# Patient Record
Sex: MALE | Race: WHITE | NOT HISPANIC OR LATINO | Employment: UNEMPLOYED | URBAN - METROPOLITAN AREA
[De-identification: names, ages, dates, MRNs, and addresses within clinical notes are randomized per-mention and may not be internally consistent; named-entity substitution may affect disease eponyms.]

---

## 2017-07-05 ENCOUNTER — ALLSCRIPTS OFFICE VISIT (OUTPATIENT)
Dept: OTHER | Facility: OTHER | Age: 57
End: 2017-07-05

## 2017-11-22 ENCOUNTER — ALLSCRIPTS OFFICE VISIT (OUTPATIENT)
Dept: OTHER | Facility: OTHER | Age: 57
End: 2017-11-22

## 2017-11-23 NOTE — PROGRESS NOTES
Assessment    1  Moderate major depression (296 22) (F32 1)    Plan  Anxiety and depression    · ClonazePAM 0 5 MG Oral Tablet; Take 1 tablet twice daily as needed  Anxiety and depression, Moderate major depression    · 1 - Seven CAAB, Danyelle Lucero  (Psychiatry) Co-Management  *  Status: Hold For - Scheduling Requested for: [id number]  Care Summary provided  : Yes    Discussion/Summary    1  Anxiety-not at goal  We discussed treatment options and patient had been seeing Psychiatry and till he had passed away  He was best maintained on Lexapro 40 mg daily  We discussed possible risk associated with this as it is above the generalized dosing guidelines, but will resume at that dose until he can get in to see Psychiatry in the future  He will be referred to Dr Terrell Cervantes  He was also offered counseling with Madyson Barrow if he desires  He will also be given clonazepam 0 5 mg to be used up to twice daily as needed for anxiety  Appropriate use of medication as well as side effects were discussed with the patient  Patient has had flights of suicidal ideation but no plan and no current thoughts  He is able to contract for safety  Recommend close follow-up in the next 2-4 weeks to reassess therapy until he gets in with Psychiatry  The patient was counseled regarding diagnostic results,-- instructions for management,-- risk factor reductions,-- prognosis,-- patient and family education,-- impressions,-- risks and benefits of treatment options,-- importance of compliance with treatment  total time of encounter was 25 minutes-- and-- 20 minutes was spent counseling  Chief Complaint  Follow up to discuss Anxiety  It has been getting worse over the past 3 weeks  History of Present Illness  This is a 80-year-old gentleman that presents to the office with concerns over increasing anxiety symptoms  He has had difficulty with anxiety and depression for many years   He had been seeing Psychiatry several years ago but eventually his psychiatrist passed away  He had been feeling best when he was on Lexapro 40 mg daily but since he has returned our office has been reduced to 20 mg daily which did not seem to help as much  He then was added on Wellbutrin which he did not see much benefit with  He does currently have several life stressors  He has had flights of suicidal ideation in the past but no plan or current ideation  Review of Systems   Constitutional: feeling poorly-- and-- feeling tired, but-- no fever-- and-- no chills  ENT: no nosebleeds-- and-- no nasal discharge  Cardiovascular: no chest pain-- and-- no palpitations  Respiratory: no shortness of breath,-- no cough-- and-- no wheezing  Gastrointestinal: no nausea-- and-- no diarrhea  Musculoskeletal: no arthralgias-- and-- no myalgias  Neurological: no headache  Hematologic/Lymphatic: no swollen glands  Active Problems  1  Back pain (724 5) (M54 9)   2  Encounter for prostate cancer screening (V76 44) (Z12 5)   3  Fatigue (780 79) (R53 83)   4  GERD without esophagitis (530 81) (K21 9)   5  Hyperlipidemia (272 4) (E78 5)   6  Moderate major depression (296 22) (F32 1)   7  Neck pain (723 1) (M54 2)   8  Right leg pain (729 5) (M79 604)    Past Medical History    The active problems and past medical history were reviewed and updated today  Social History     · Being A Social Drinker   · Chews tobacco (305 1) (Z72 0)   · Employed   ·    · Never a smoker   · No secondhand smoke exposure (V49 89) (Z78 9)    Current Meds   1  BuPROPion HCl ER (XL) 150 MG Oral Tablet Extended Release 24 Hour; take 1 tab daily; Therapy: [id number] to (Last Rx:[id number])  Requested for: 09JUH9620; Status: ACTIVE - Transmit to Pharmacy - Awaiting Verification Ordered   2  Escitalopram Oxalate 20 MG Oral Tablet; TAKE 1 TABLET DAILY (OFFICE VISIT NEEDED);  Therapy: [id number] to (Last OO:[id number])  Requested for: [id number] Ordered    The medication list was reviewed and updated today  Allergies  1  No Known Drug Allergies    Vitals  Vital Signs    Recorded: 22Nov2017 11:53AM   Heart Rate 76   Systolic 167   Diastolic 90   Height 5 ft 10 in   Weight 188 lb    BMI Calculated 26 98   BSA Calculated 2 03       Physical Exam   Constitutional  General appearance: No acute distress, well appearing and well nourished  Pulmonary  Respiratory effort: No increased work of breathing or signs of respiratory distress  Musculoskeletal  Gait and station: Normal    Skin  Skin and subcutaneous tissue: Normal without rashes or lesions     Psychiatric  Orientation to person, place and time: Normal    Mood and affect: Normal          Signatures   Electronically signed by : Shiv Barroso [facility]; Nov 22 2017 12:22PM EST                       (Author)    Electronically signed by : MARVIN Canada ; Nov 22 2017  6:19PM EST

## 2018-01-12 VITALS
BODY MASS INDEX: 28.54 KG/M2 | DIASTOLIC BLOOD PRESSURE: 80 MMHG | SYSTOLIC BLOOD PRESSURE: 120 MMHG | WEIGHT: 199.38 LBS | HEIGHT: 70 IN | HEART RATE: 68 BPM

## 2018-01-14 VITALS
HEART RATE: 76 BPM | HEIGHT: 70 IN | DIASTOLIC BLOOD PRESSURE: 90 MMHG | WEIGHT: 188 LBS | SYSTOLIC BLOOD PRESSURE: 138 MMHG | BODY MASS INDEX: 26.92 KG/M2

## 2018-02-06 DIAGNOSIS — F32.1 MODERATE MAJOR DEPRESSION (HCC): Primary | ICD-10-CM

## 2018-02-06 RX ORDER — ESCITALOPRAM OXALATE 20 MG/1
TABLET ORAL
Qty: 90 TABLET | Refills: 0 | Status: SHIPPED | OUTPATIENT
Start: 2018-02-06 | End: 2019-04-18

## 2019-04-18 ENCOUNTER — OFFICE VISIT (OUTPATIENT)
Dept: FAMILY MEDICINE CLINIC | Facility: CLINIC | Age: 59
End: 2019-04-18
Payer: COMMERCIAL

## 2019-04-18 VITALS
HEIGHT: 70 IN | DIASTOLIC BLOOD PRESSURE: 70 MMHG | WEIGHT: 191 LBS | SYSTOLIC BLOOD PRESSURE: 124 MMHG | HEART RATE: 84 BPM | BODY MASS INDEX: 27.35 KG/M2 | TEMPERATURE: 98.7 F

## 2019-04-18 DIAGNOSIS — H65.92 LEFT OTITIS MEDIA WITH EFFUSION: Primary | ICD-10-CM

## 2019-04-18 PROCEDURE — 92567 TYMPANOMETRY: CPT | Performed by: PHYSICIAN ASSISTANT

## 2019-04-18 PROCEDURE — 99214 OFFICE O/P EST MOD 30 MIN: CPT | Performed by: PHYSICIAN ASSISTANT

## 2019-04-18 PROCEDURE — 1036F TOBACCO NON-USER: CPT | Performed by: PHYSICIAN ASSISTANT

## 2019-04-18 PROCEDURE — 3008F BODY MASS INDEX DOCD: CPT | Performed by: PHYSICIAN ASSISTANT

## 2019-04-18 RX ORDER — BUSPIRONE HYDROCHLORIDE 30 MG/1
TABLET ORAL
COMMUNITY
Start: 2019-03-25

## 2019-04-18 RX ORDER — DULOXETIN HYDROCHLORIDE 60 MG/1
CAPSULE, DELAYED RELEASE ORAL
COMMUNITY
Start: 2019-03-25

## 2019-04-18 RX ORDER — CEFUROXIME AXETIL 250 MG/1
250 TABLET ORAL EVERY 12 HOURS SCHEDULED
Qty: 20 TABLET | Refills: 0 | Status: SHIPPED | OUTPATIENT
Start: 2019-04-18 | End: 2019-04-28

## 2019-11-19 ENCOUNTER — OFFICE VISIT (OUTPATIENT)
Dept: FAMILY MEDICINE CLINIC | Facility: CLINIC | Age: 59
End: 2019-11-19
Payer: COMMERCIAL

## 2019-11-19 VITALS
TEMPERATURE: 98 F | HEIGHT: 70 IN | RESPIRATION RATE: 16 BRPM | HEART RATE: 76 BPM | SYSTOLIC BLOOD PRESSURE: 132 MMHG | BODY MASS INDEX: 28.15 KG/M2 | DIASTOLIC BLOOD PRESSURE: 78 MMHG | WEIGHT: 196.6 LBS

## 2019-11-19 DIAGNOSIS — M54.32 LEFT SIDED SCIATICA: Primary | ICD-10-CM

## 2019-11-19 DIAGNOSIS — F41.9 ANXIETY AND DEPRESSION: ICD-10-CM

## 2019-11-19 DIAGNOSIS — Z23 ENCOUNTER FOR IMMUNIZATION: ICD-10-CM

## 2019-11-19 DIAGNOSIS — F32.A ANXIETY AND DEPRESSION: ICD-10-CM

## 2019-11-19 PROCEDURE — 99214 OFFICE O/P EST MOD 30 MIN: CPT | Performed by: PHYSICIAN ASSISTANT

## 2019-11-19 PROCEDURE — 90682 RIV4 VACC RECOMBINANT DNA IM: CPT | Performed by: FAMILY MEDICINE

## 2019-11-19 PROCEDURE — 1036F TOBACCO NON-USER: CPT | Performed by: PHYSICIAN ASSISTANT

## 2019-11-19 PROCEDURE — 90471 IMMUNIZATION ADMIN: CPT | Performed by: FAMILY MEDICINE

## 2019-11-19 RX ORDER — PREDNISONE 20 MG/1
60 TABLET ORAL DAILY
Qty: 15 TABLET | Refills: 0 | Status: SHIPPED | OUTPATIENT
Start: 2019-11-19 | End: 2019-11-24

## 2019-11-19 RX ORDER — TIZANIDINE 4 MG/1
4 TABLET ORAL EVERY 8 HOURS PRN
Qty: 30 TABLET | Refills: 0 | Status: SHIPPED | OUTPATIENT
Start: 2019-11-19 | End: 2020-04-14 | Stop reason: SDUPTHER

## 2019-11-19 NOTE — PROGRESS NOTES
Assessment/Plan:  Patient Instructions     1  Left-sided sciatica- I did review x-ray report with patient showing disc space narrowing  It is likely has some bulging discs in the lumbar spine and is having exacerbation of symptoms  We will try a short course of prednisone 60 mg daily for 5 days  He was given tizanidine 4 mg as necessary for muscle spasm or discomfort  If his symptoms are not improving in the next week would consider further MRI since he has already been seeing chiropractics and doing regular stretching exercises  2   Anxiety with depression -presently stable on Cymbalta and Wellbutrin  3   Hyperlipidemia- stable with diet control  Diagnoses and all orders for this visit:    Left sided sciatica  -     predniSONE 20 mg tablet; Take 3 tablets (60 mg total) by mouth daily for 5 days  -     tiZANidine (ZANAFLEX) 4 mg tablet; Take 1 tablet (4 mg total) by mouth every 8 (eight) hours as needed for muscle spasms    Anxiety and depression          Subjective:      Patient ID: Vega Esquivel is a 61 y o  male  HPI:  This is a 49-year-old gentleman that presents to the office with concerns over pain in his low back which has been radiating down his left leg for the past month or so  Symptoms have been getting a little worse recently and have been persistent  He is not having specific night symptoms but does occasionally have difficulty with waking and having trouble finding a comfortable sleeping position  He has pain when bending to his left side  Pain seems to radiate down the anterior portion of his leg toward his ankle  He has had history of low back problems in the past but more on the right side  He has been seeing a chiropractor and had x-rays recently  He has not been having any stumbling, tripping, or foot drop        The following portions of the patient's history were reviewed and updated as appropriate: allergies, current medications, past family history, past medical history, past social history, past surgical history and problem list     Review of Systems   Constitutional: Negative for chills, fatigue and fever  HENT: Negative for congestion, ear pain and sinus pressure  Eyes: Negative for visual disturbance  Respiratory: Negative for cough, chest tightness and shortness of breath  Cardiovascular: Negative for chest pain and palpitations  Gastrointestinal: Negative for diarrhea, nausea and vomiting  Endocrine: Negative for polyuria  Genitourinary: Negative for dysuria and frequency  Musculoskeletal: Positive for arthralgias, back pain and myalgias  Skin: Negative for pallor and rash  Neurological: Negative for dizziness, weakness, light-headedness, numbness and headaches  Psychiatric/Behavioral: Negative for agitation, behavioral problems and sleep disturbance  All other systems reviewed and are negative  Objective:      /78 (BP Location: Left arm, Patient Position: Sitting, Cuff Size: Adult)   Pulse 76   Temp 98 °F (36 7 °C) (Oral)   Resp 16   Ht 5' 10" (1 778 m)   Wt 89 2 kg (196 lb 9 6 oz)   BMI 28 21 kg/m²          Physical Exam   Constitutional: He is oriented to person, place, and time  He appears well-developed and well-nourished  No distress  HENT:   Head: Normocephalic and atraumatic  Right Ear: External ear normal    Left Ear: External ear normal    Nose: Nose normal    Mouth/Throat: Oropharynx is clear and moist  No oropharyngeal exudate  Eyes: Pupils are equal, round, and reactive to light  Conjunctivae and EOM are normal    Neck: Normal range of motion  Neck supple  No tracheal deviation present  No thyromegaly present  Cardiovascular: Normal rate, regular rhythm and normal heart sounds  Exam reveals no friction rub  No murmur heard  Pulmonary/Chest: Effort normal and breath sounds normal  No respiratory distress  He has no wheezes  He has no rales  Abdominal: Soft   Bowel sounds are normal  He exhibits no distension  There is no tenderness  There is no rebound and no guarding  Musculoskeletal: Normal range of motion  He exhibits no edema or tenderness  Patient does have some discomfort with left lateral flexion of the lumbar spine  There is no point tenderness to palpation  Lymphadenopathy:     He has no cervical adenopathy  Neurological: He is alert and oriented to person, place, and time  No cranial nerve deficit or sensory deficit  He exhibits normal muscle tone  Coordination normal    Skin: Skin is warm and dry  No rash noted  No erythema  Psychiatric: He has a normal mood and affect  His behavior is normal  Thought content normal    Nursing note and vitals reviewed  BMI Counseling: Body mass index is 28 21 kg/m²  The BMI is above normal  Nutrition recommendations include reducing portion sizes

## 2019-11-19 NOTE — PATIENT INSTRUCTIONS
1  Left-sided sciatica- I did review x-ray report with patient showing disc space narrowing  It is likely has some bulging discs in the lumbar spine and is having exacerbation of symptoms  We will try a short course of prednisone 60 mg daily for 5 days  He was given tizanidine 4 mg as necessary for muscle spasm or discomfort  If his symptoms are not improving in the next week would consider further MRI since he has already been seeing chiropractics and doing regular stretching exercises  2   Anxiety with depression -presently stable on Cymbalta and Wellbutrin  3   Hyperlipidemia- stable with diet control

## 2020-02-20 ENCOUNTER — TELEPHONE (OUTPATIENT)
Dept: FAMILY MEDICINE CLINIC | Facility: CLINIC | Age: 60
End: 2020-02-20

## 2020-02-20 NOTE — TELEPHONE ENCOUNTER
PATIENT CALLED IN STATES HE TAKS ANTIDEPRESSANTS AND WOULD LIKE A NOTE TO BE EXCUSED FROM JURY DUTY  JUTY DUTY IS SCHEDULED TO BE ON 4/15/2020   PLEASE REACH OUT TO PT WHEN LETTER HAS BEEN COMPOSED HE WILL PICK IT UP

## 2020-02-20 NOTE — LETTER
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

## 2020-04-01 ENCOUNTER — TELEPHONE (OUTPATIENT)
Dept: FAMILY MEDICINE CLINIC | Facility: CLINIC | Age: 60
End: 2020-04-01

## 2020-04-01 DIAGNOSIS — D50.9 IRON DEFICIENCY ANEMIA, UNSPECIFIED IRON DEFICIENCY ANEMIA TYPE: ICD-10-CM

## 2020-04-01 DIAGNOSIS — E78.2 MIXED HYPERLIPIDEMIA: ICD-10-CM

## 2020-04-01 DIAGNOSIS — Z12.5 SCREENING FOR MALIGNANT NEOPLASM OF PROSTATE: ICD-10-CM

## 2020-04-01 DIAGNOSIS — F32.A ANXIETY AND DEPRESSION: Primary | ICD-10-CM

## 2020-04-01 DIAGNOSIS — F41.9 ANXIETY AND DEPRESSION: Primary | ICD-10-CM

## 2020-04-14 ENCOUNTER — TELEMEDICINE (OUTPATIENT)
Dept: FAMILY MEDICINE CLINIC | Facility: CLINIC | Age: 60
End: 2020-04-14
Payer: COMMERCIAL

## 2020-04-14 VITALS — BODY MASS INDEX: 26.48 KG/M2 | HEIGHT: 70 IN | WEIGHT: 185 LBS

## 2020-04-14 DIAGNOSIS — F32.1 MODERATE MAJOR DEPRESSION (HCC): ICD-10-CM

## 2020-04-14 DIAGNOSIS — R71.8: ICD-10-CM

## 2020-04-14 DIAGNOSIS — E78.2 MIXED HYPERLIPIDEMIA: Primary | ICD-10-CM

## 2020-04-14 PROCEDURE — 3008F BODY MASS INDEX DOCD: CPT | Performed by: PHYSICIAN ASSISTANT

## 2020-04-14 PROCEDURE — 99213 OFFICE O/P EST LOW 20 MIN: CPT | Performed by: PHYSICIAN ASSISTANT

## 2020-04-14 RX ORDER — CHOLECALCIFEROL (VITAMIN D3) 25 MCG
CAPSULE ORAL
COMMUNITY

## 2020-04-14 RX ORDER — VITAMIN B COMPLEX
1 CAPSULE ORAL DAILY
COMMUNITY

## 2020-04-14 RX ORDER — MULTIVITAMIN
1 CAPSULE ORAL DAILY
COMMUNITY

## 2020-10-24 ENCOUNTER — IMMUNIZATIONS (OUTPATIENT)
Dept: FAMILY MEDICINE CLINIC | Facility: CLINIC | Age: 60
End: 2020-10-24
Payer: COMMERCIAL

## 2020-10-24 DIAGNOSIS — Z23 ENCOUNTER FOR IMMUNIZATION: ICD-10-CM

## 2020-10-24 PROCEDURE — 90471 IMMUNIZATION ADMIN: CPT

## 2020-10-24 PROCEDURE — 90682 RIV4 VACC RECOMBINANT DNA IM: CPT

## 2020-11-13 ENCOUNTER — TELEMEDICINE (OUTPATIENT)
Dept: FAMILY MEDICINE CLINIC | Facility: CLINIC | Age: 60
End: 2020-11-13
Payer: COMMERCIAL

## 2020-11-13 VITALS — WEIGHT: 185 LBS | BODY MASS INDEX: 26.48 KG/M2 | HEIGHT: 70 IN | TEMPERATURE: 97.6 F

## 2020-11-13 DIAGNOSIS — K52.9 GASTROENTERITIS: Primary | ICD-10-CM

## 2020-11-13 PROCEDURE — 99213 OFFICE O/P EST LOW 20 MIN: CPT | Performed by: FAMILY MEDICINE

## 2020-11-13 PROCEDURE — 3008F BODY MASS INDEX DOCD: CPT | Performed by: FAMILY MEDICINE

## 2020-11-13 PROCEDURE — 1036F TOBACCO NON-USER: CPT | Performed by: FAMILY MEDICINE

## 2021-03-31 DIAGNOSIS — Z23 ENCOUNTER FOR IMMUNIZATION: ICD-10-CM

## 2021-05-28 ENCOUNTER — PROCEDURE VISIT (OUTPATIENT)
Dept: FAMILY MEDICINE CLINIC | Facility: CLINIC | Age: 61
End: 2021-05-28
Payer: COMMERCIAL

## 2021-05-28 VITALS
SYSTOLIC BLOOD PRESSURE: 154 MMHG | TEMPERATURE: 97.1 F | WEIGHT: 208.5 LBS | DIASTOLIC BLOOD PRESSURE: 82 MMHG | BODY MASS INDEX: 29.85 KG/M2 | HEIGHT: 70 IN

## 2021-05-28 DIAGNOSIS — B07.8 COMMON WART: Primary | ICD-10-CM

## 2021-05-28 DIAGNOSIS — D49.2 SKIN NEOPLASM: ICD-10-CM

## 2021-05-28 PROCEDURE — 17110 DESTRUCTION B9 LES UP TO 14: CPT | Performed by: PHYSICIAN ASSISTANT

## 2021-05-28 PROCEDURE — 3008F BODY MASS INDEX DOCD: CPT | Performed by: PHYSICIAN ASSISTANT

## 2021-05-28 PROCEDURE — 3725F SCREEN DEPRESSION PERFORMED: CPT | Performed by: PHYSICIAN ASSISTANT

## 2021-05-28 PROCEDURE — 99213 OFFICE O/P EST LOW 20 MIN: CPT | Performed by: PHYSICIAN ASSISTANT

## 2021-05-28 RX ORDER — MAGNESIUM 30 MG
30 TABLET ORAL 2 TIMES DAILY
COMMUNITY

## 2021-05-28 RX ORDER — ZINC GLUCONATE 50 MG
50 TABLET ORAL DAILY
COMMUNITY

## 2021-05-28 NOTE — PROGRESS NOTES
Assessment and Plan:  Patient Instructions   Assessment/plan:  1  Skin lesions-see procedure note  3 skin lesions were destroyed with Histofreeze destruction  2  Tremor-this seems to be benign intention tremor  It has not caused any limitation at this time  Would recommend continued monitoring  If symptoms worsen consider beta-blocker therapy and evaluation by Neurology  Patient verbalizes understanding and agreement with plan  Problem List Items Addressed This Visit     None                 There are no diagnoses linked to this encounter  Subjective:      Patient ID: Sunshine Mckeon is a 64 y o  male  CC:    Chief Complaint   Patient presents with    Verrucous Vulgaris     wart removal on neck and mole on left knee  mgb    Shaking     pt states his has have begun shaking without his control    mgb       HPI:    HPI:  This is a 79-year-old gentleman that presents to the office for removal of 3 lesions that have been bothering him  1 is on the posterior neck and seems to get irritated around his neck line  He also has 1 on the anterior left chest which gets irritated by the short period he has a wart on the left anterior knee which has been growing in size  He would also like to discuss tremors which have been going on for a few months  This seems to be notable when he is trying to do something such as either use a fork  He does not have any problem with it at rest and has not made it difficult to complete any task at this time  He does not have any significant family history of tremor or neurologic illness  The following portions of the patient's history were reviewed and updated as appropriate: allergies, current medications, past family history, past medical history, past social history, past surgical history and problem list       Review of Systems   Constitutional: Negative for chills, fatigue and fever  HENT: Negative for congestion, ear pain and sinus pressure  Eyes: Negative for visual disturbance  Respiratory: Negative for cough, chest tightness and shortness of breath  Cardiovascular: Negative for chest pain and palpitations  Gastrointestinal: Negative for diarrhea, nausea and vomiting  Endocrine: Negative for polyuria  Genitourinary: Negative for dysuria and frequency  Musculoskeletal: Negative for arthralgias and myalgias  Skin: Negative for pallor and rash  Neurological: Positive for tremors  Negative for dizziness, weakness, light-headedness, numbness and headaches  Psychiatric/Behavioral: Negative for agitation, behavioral problems and sleep disturbance  All other systems reviewed and are negative  Data to review:       Objective:    Vitals:    05/28/21 1359   BP: 154/82   BP Location: Left arm   Patient Position: Sitting   Cuff Size: Standard   Temp: (!) 97 1 °F (36 2 °C)   TempSrc: Temporal   Weight: 94 6 kg (208 lb 8 oz)   Height: 5' 10" (1 778 m)        Physical Exam  Constitutional:       General: He is not in acute distress  Appearance: He is well-developed  HENT:      Head: Normocephalic and atraumatic  Right Ear: Tympanic membrane normal       Left Ear: Tympanic membrane normal    Eyes:      Conjunctiva/sclera: Conjunctivae normal    Neck:      Musculoskeletal: Normal range of motion  Cardiovascular:      Rate and Rhythm: Normal rate and regular rhythm  Pulmonary:      Effort: Pulmonary effort is normal    Abdominal:      General: Abdomen is flat  Bowel sounds are normal  There is no distension  Palpations: Abdomen is soft  There is no mass  Musculoskeletal: Normal range of motion  Skin:     General: Skin is warm  Findings: No rash  Comments: There are 3 lesions addressed today  There is a lesion on the posterior neck which is approximately 0 4 cm, rather flat, flesh-colored without pigmentation  There is a skin lesion of the anterior left chest approximately 0 7 cm, flesh-colored, elevated  There is a verruca of the anterior left knee which is approximately 0 7 cm and thickened with keratotic tissue on the surface  Neurological:      Mental Status: He is alert and oriented to person, place, and time  Psychiatric:         Mood and Affect: Mood normal            BMI Counseling: Body mass index is 29 92 kg/m²  The BMI is above normal  Nutrition recommendations include decreasing portion sizes  Exercise recommendations include exercising 3-5 times per week  Lesion Destruction    Date/Time: 5/28/2021 2:35 PM  Performed by: Edmar Ford PA-C  Authorized by: Edmar Ford PA-C   Universal Protocol:  Consent: Verbal consent obtained  Risks and benefits: risks, benefits and alternatives were discussed  Consent given by: patient  Time out: Immediately prior to procedure a "time out" was called to verify the correct patient, procedure, equipment, support staff and site/side marked as required  Timeout called at: 5/28/2021 2:05 PM   Patient understanding: patient states understanding of the procedure being performed  Patient consent: the patient's understanding of the procedure matches consent given  Procedure consent: procedure consent matches procedure scheduled  Relevant documents: relevant documents present and verified  Test results: test results available and properly labeled  Site marked: the operative site was marked  Radiology Images displayed and confirmed  If images not available, report reviewed: imaging studies not available  Required items: required blood products, implants, devices, and special equipment available  Patient identity confirmed: verbally with patient      Procedure Details - Lesion Destruction:     Number of Lesions:  3  Lesion 1:     [facility]  [address] [facility]  [facility]    Initial size (mm):  4    Final defect size (mm):  4    Malignancy: benign lesion      Destruction method: cryotherapy      Cosmetic: No, inflamed      Lesion 2:     Body area: Trunk    Trunk location:  Chest    Initial size (mm):  7    Final defect size (mm):  7    Malignancy: benign lesion      Destruction method: cryotherapy      Cosmetic: No, inflamed  Lesion 3:     Body area:  Lower extremity    Lower extremity location:  L knee    Initial size (mm):  7    Final defect size (mm):  7    Malignancy: benign hyperkeratotic lesion      Destruction method comment:  15 blade was used to [place] down excessive keratotic skin, then Histofreeze destruction/cryotherapy  Cosmetic: No, enlarging  Patient tolerated procedures well  Home care was discussed  Follow-up as needed

## 2021-05-28 NOTE — PATIENT INSTRUCTIONS
Assessment/plan:  1  Skin lesions-see procedure note  3 skin lesions were destroyed with Histofreeze destruction  2  Tremor-this seems to be benign intention tremor  It has not caused any limitation at this time  Would recommend continued monitoring  If symptoms worsen consider beta-blocker therapy and evaluation by Neurology  Patient verbalizes understanding and agreement with plan

## 2024-05-07 ENCOUNTER — OFFICE VISIT (OUTPATIENT)
Dept: FAMILY MEDICINE CLINIC | Facility: CLINIC | Age: 64
End: 2024-05-07
Payer: COMMERCIAL

## 2024-05-07 VITALS
SYSTOLIC BLOOD PRESSURE: 126 MMHG | OXYGEN SATURATION: 97 % | BODY MASS INDEX: 29.35 KG/M2 | HEART RATE: 83 BPM | HEIGHT: 70 IN | DIASTOLIC BLOOD PRESSURE: 68 MMHG | WEIGHT: 205 LBS

## 2024-05-07 DIAGNOSIS — M54.31 RIGHT SIDED SCIATICA: ICD-10-CM

## 2024-05-07 DIAGNOSIS — R05.3 COUGH, PERSISTENT: Primary | ICD-10-CM

## 2024-05-07 DIAGNOSIS — H93.8X3 SENSATION OF FULLNESS IN BOTH EARS: ICD-10-CM

## 2024-05-07 DIAGNOSIS — E78.2 MIXED HYPERLIPIDEMIA: ICD-10-CM

## 2024-05-07 DIAGNOSIS — Z12.5 SCREENING FOR MALIGNANT NEOPLASM OF PROSTATE: ICD-10-CM

## 2024-05-07 PROCEDURE — 99214 OFFICE O/P EST MOD 30 MIN: CPT | Performed by: PHYSICIAN ASSISTANT

## 2024-05-07 RX ORDER — PREDNISONE 10 MG/1
TABLET ORAL
Qty: 30 TABLET | Refills: 0 | Status: SHIPPED | OUTPATIENT
Start: 2024-05-07 | End: 2024-05-17

## 2024-05-07 NOTE — PROGRESS NOTES
Name: Vj Dumont      : 1960      MRN: [id number]  Encounter Provider: Roger Velasco PA-C  Encounter Date: 2024   Encounter department: [facility] PRIMARY CARE    Assessment & Plan     Patient Instructions   Assessment/plan:  1.  Cough-persistent x 3 weeks.  This does seem to be mostly allergy related, secondary to postnasal drip.  Lungs are clear without sign of pneumonia or bronchitis.  Patient is afebrile.  Recommend continuing plain Mucinex daily and will add prednisone 10 mg, 5 tablets daily for 2 days, then 4 tablets daily for 2 days, then 3 tablets daily for 2 days, then 2 tablets daily for 2 days, then 1 tablet daily for 2 days.  Thirty pills with no refills.  Follow-up in the next week or 2 if symptoms are persisting.  2.  Right-sided sciatica-patient does not have any foot drop or decrease sensation.  Recommend trial of prednisone therapy as above.  If it is persisting will get x-rays and referral to physical therapy.  3.  Ear fullness-patient has a small cyst in the canal of the left ear.  This appears benign, noninfected.  Continue observation.  4.  Fingertip numbness-this is isolated to the middle finger just at the very distal tip, possibly local trauma or arthritic nodule.  No sign of infection.  Again observation is recommended at this time.  5.  Hyperlipidemia-recommend assessing complete labs and follow-up with results as necessary.  6.  Screening for prostate cancer-PSA ordered.   1. Cough, persistent  -     CBC and differential  -     Comprehensive metabolic panel  -     Lipid Panel with Direct LDL reflex  -     TSH, 3rd generation with Free T4 reflex  -     predniSONE 10 mg tablet; 5,5,4,4,3,3,2,2,1,1    2. Right sided sciatica  -     predniSONE 10 mg tablet; 5,5,4,4,3,3,2,2,1,1    3. Sensation of fullness in both ears    4. Screening for malignant neoplasm of prostate  -     PSA, Total Screen    5. Mixed hyperlipidemia  -     CBC and differential  -      Comprehensive metabolic panel  -     Lipid Panel with Direct LDL reflex  -     TSH, 3rd generation with Free T4 reflex           Subjective      HPI: This is a 64-year-old gentleman that presents to the office with concerns over cough and postnasal drip that have been persistent for about 3 weeks.  He has not had any fevers or felt ill with this but feels that he has a lot of drainage from the nose.  He is not having any head pressure or sinus headaches.  He denies any pain into the teeth.  He has been using some Claritin initially without relief and tried using Mucinex D with some more benefit but was concerned about taking this longer term.  He also has been having a sensation of something in the canal of his left ear.  He would like to have it looked at.  It does not seem painful or bothersome but just unusual.  He also has had some back pain for about 2 weeks and this seems to radiate down the right leg.  It has not caused any significant limitation at this time but patient would like to get it checked while he is here.  He has also had some localized numbness at the tip of the middle finger.  Patient states that he wonders if this could be from bowling.      Review of Systems   Constitutional:  Negative for chills, fatigue and fever.   HENT:  Negative for congestion, ear pain and sinus pressure.    Eyes:  Negative for visual disturbance.   Respiratory:  Positive for cough. Negative for chest tightness and shortness of breath.    Cardiovascular:  Negative for chest pain and palpitations.   Gastrointestinal:  Negative for diarrhea, nausea and vomiting.   Endocrine: Negative for polyuria.   Genitourinary:  Negative for dysuria and frequency.   Musculoskeletal:  Negative for arthralgias and myalgias.   Skin:  Negative for pallor and rash.   Neurological:  Negative for dizziness, weakness, light-headedness, numbness and headaches.   Psychiatric/Behavioral:  Negative for agitation, behavioral problems and sleep  "disturbance.    All other systems reviewed and are negative.      Current Outpatient Medications on File Prior to Visit   Medication Sig   • DULoxetine (CYMBALTA) 60 mg delayed release capsule Pt states he only takes 40mg   • [DISCONTINUED] b complex vitamins capsule Take 1 capsule by mouth daily   • [DISCONTINUED] busPIRone (BUSPAR) 30 MG tablet    • [DISCONTINUED] Cholecalciferol (VITAMIN D-3) 25 MCG (1000 UT) CAPS Take by mouth   • [DISCONTINUED] co-enzyme Q-10 30 MG capsule Take 30 mg by mouth 3 (three) times a day   • [DISCONTINUED] magnesium 30 MG tablet Take 30 mg by mouth 2 (two) times a day   • [DISCONTINUED] Multiple Vitamin (MULTIVITAMIN) capsule Take 1 capsule by mouth daily   • [DISCONTINUED] TURMERIC PO Take by mouth   • [DISCONTINUED] vitamin E 100 UNIT capsule Take 100 Units by mouth daily   • [DISCONTINUED] zinc gluconate 50 mg tablet Take 50 mg by mouth daily       Objective     /68 (BP Location: Left arm, Patient Position: Sitting, Cuff Size: Standard)   Pulse 83   Ht 5' 10\" (1.778 m)   Wt 93 kg (205 lb)   SpO2 97%   BMI 29.41 kg/m²     Physical Exam  Vitals and nursing note reviewed.   Constitutional:       General: He is not in acute distress.     Appearance: He is well-developed.   HENT:      Head: Normocephalic and atraumatic.      Right Ear: External ear normal.      Left Ear: External ear normal.      Nose: Nose normal.      Mouth/Throat:      Pharynx: No oropharyngeal exudate.      Comments: Postnasal drip of the posterior pharynx present.  Eyes:      Conjunctiva/sclera: Conjunctivae normal.      Pupils: Pupils are equal, round, and reactive to light.   Neck:      Thyroid: No thyromegaly.      Trachea: No tracheal deviation.   Cardiovascular:      Rate and Rhythm: Normal rate and regular rhythm.      Heart sounds: Normal heart sounds. No murmur heard.     No friction rub.   Pulmonary:      Effort: Pulmonary effort is normal. No respiratory distress.      Breath sounds: Normal " breath sounds. No wheezing or rales.   Abdominal:      General: Bowel sounds are normal. There is no distension.      Palpations: Abdomen is soft.      Tenderness: There is no abdominal tenderness. There is no guarding or rebound.   Musculoskeletal:         General: No tenderness. Normal range of motion.      Cervical back: Normal range of motion and neck supple.   Lymphadenopathy:      Cervical: No cervical adenopathy.   Skin:     General: Skin is warm and dry.      Findings: No erythema or rash.   Neurological:      Mental Status: He is alert and oriented to person, place, and time.      Cranial Nerves: No cranial nerve deficit.      Coordination: Coordination normal.   Psychiatric:         Behavior: Behavior normal.         Thought Content: Thought content normal.       Roger Velasco PA-C

## 2024-05-07 NOTE — PATIENT INSTRUCTIONS
Assessment/plan:  1.  Cough-persistent x 3 weeks.  This does seem to be mostly allergy related, secondary to postnasal drip.  Lungs are clear without sign of pneumonia or bronchitis.  Patient is afebrile.  Recommend continuing plain Mucinex daily and will add prednisone 10 mg, 5 tablets daily for 2 days, then 4 tablets daily for 2 days, then 3 tablets daily for 2 days, then 2 tablets daily for 2 days, then 1 tablet daily for 2 days.  Thirty pills with no refills.  Follow-up in the next week or 2 if symptoms are persisting.  2.  Right-sided sciatica-patient does not have any foot drop or decrease sensation.  Recommend trial of prednisone therapy as above.  If it is persisting will get x-rays and referral to physical therapy.  3.  Ear fullness-patient has a small cyst in the canal of the left ear.  This appears benign, noninfected.  Continue observation.  4.  Fingertip numbness-this is isolated to the middle finger just at the very distal tip, possibly local trauma or arthritic nodule.  No sign of infection.  Again observation is recommended at this time.  5.  Hyperlipidemia-recommend assessing complete labs and follow-up with results as necessary.  6.  Screening for prostate cancer-PSA ordered.

## 2024-06-08 LAB
ALBUMIN SERPL-MCNC: 4.2 G/DL (ref 3.5–5.7)
ALP SERPL-CCNC: 56 U/L (ref 35–120)
ALT SERPL-CCNC: 15 U/L
ANION GAP SERPL CALCULATED.3IONS-SCNC: 8 MMOL/L (ref 3–11)
AST SERPL-CCNC: 13 U/L
BASOPHILS # BLD AUTO: 0 THOU/CMM (ref 0–0.1)
BASOPHILS NFR BLD AUTO: 1 %
BILIRUB SERPL-MCNC: 0.6 MG/DL (ref 0.2–1)
BUN SERPL-MCNC: 14 MG/DL (ref 7–28)
CALCIUM SERPL-MCNC: 9.1 MG/DL (ref 8.5–10.1)
CHLORIDE SERPL-SCNC: 106 MMOL/L (ref 100–109)
CHOLEST SERPL-MCNC: 223 MG/DL
CHOLEST/HDLC SERPL: 4.4 {RATIO}
CO2 SERPL-SCNC: 28 MMOL/L (ref 21–31)
CREAT SERPL-MCNC: 0.98 MG/DL (ref 0.53–1.3)
CYTOLOGY CMNT CVX/VAG CYTO-IMP: NORMAL
DIFFERENTIAL METHOD BLD: ABNORMAL
EOSINOPHIL # BLD AUTO: 0.3 THOU/CMM (ref 0–0.5)
EOSINOPHIL NFR BLD AUTO: 6 %
ERYTHROCYTE [DISTWIDTH] IN BLOOD BY AUTOMATED COUNT: 13.4 % (ref 12–16)
GFR/BSA.PRED SERPLBLD CYS-BASED-ARV: 86 ML/MIN/{1.73_M2}
GLUCOSE SERPL-MCNC: 85 MG/DL (ref 65–99)
HCT VFR BLD AUTO: 45.3 % (ref 37–48)
HDLC SERPL-MCNC: 51 MG/DL (ref 23–92)
HGB BLD-MCNC: 15.6 G/DL (ref 12.5–17)
LDLC SERPL CALC-MCNC: 141 MG/DL
LYMPHOCYTES # BLD AUTO: 1.4 THOU/CMM (ref 1–3)
LYMPHOCYTES NFR BLD AUTO: 28 %
MCH RBC QN AUTO: 29.9 PG (ref 27–36)
MCHC RBC AUTO-ENTMCNC: 34.3 G/DL (ref 32–37)
MCV RBC AUTO: 87 FL (ref 80–100)
MONOCYTES # BLD AUTO: 0.6 THOU/CMM (ref 0.3–1)
MONOCYTES NFR BLD AUTO: 11 %
NEUTROPHILS # BLD AUTO: 2.7 THOU/CMM (ref 1.8–7.8)
NEUTROPHILS NFR BLD AUTO: 54 %
NONHDLC SERPL-MCNC: 172 MG/DL
PLATELET # BLD AUTO: 287 THOU/CMM (ref 140–350)
PMV BLD REES-ECKER: 7.4 FL (ref 7.5–11.3)
POTASSIUM SERPL-SCNC: 4.6 MMOL/L (ref 3.5–5.2)
PROT SERPL-MCNC: 6.6 G/DL (ref 6.3–8.3)
PSA SERPL-MCNC: 2.96 NG/ML
RBC # BLD AUTO: 5.2 MILL/CMM (ref 4–5.4)
SODIUM SERPL-SCNC: 142 MMOL/L (ref 135–145)
TRIGL SERPL-MCNC: 157 MG/DL
TSH SERPL-ACNC: 2.22 UIU/ML (ref 0.45–5.33)
WBC # BLD AUTO: 5 THOU/CMM (ref 4–10.5)

## 2025-07-07 ENCOUNTER — OFFICE VISIT (OUTPATIENT)
Dept: FAMILY MEDICINE CLINIC | Facility: CLINIC | Age: 65
End: 2025-07-07
Payer: COMMERCIAL

## 2025-07-07 VITALS
HEART RATE: 76 BPM | SYSTOLIC BLOOD PRESSURE: 140 MMHG | WEIGHT: 193 LBS | HEIGHT: 68 IN | BODY MASS INDEX: 29.25 KG/M2 | DIASTOLIC BLOOD PRESSURE: 88 MMHG | OXYGEN SATURATION: 98 %

## 2025-07-07 DIAGNOSIS — E78.2 MIXED HYPERLIPIDEMIA: ICD-10-CM

## 2025-07-07 DIAGNOSIS — Z23 ENCOUNTER FOR IMMUNIZATION: ICD-10-CM

## 2025-07-07 DIAGNOSIS — Z12.5 SCREENING FOR PROSTATE CANCER: ICD-10-CM

## 2025-07-07 DIAGNOSIS — H69.92 EUSTACHIAN TUBE DYSFUNCTION, LEFT: ICD-10-CM

## 2025-07-07 DIAGNOSIS — Z00.00 ANNUAL PHYSICAL EXAM: Primary | ICD-10-CM

## 2025-07-07 DIAGNOSIS — T14.8XXA SKIN ABRASION: ICD-10-CM

## 2025-07-07 PROCEDURE — 99397 PER PM REEVAL EST PAT 65+ YR: CPT | Performed by: PHYSICIAN ASSISTANT

## 2025-07-07 PROCEDURE — 90715 TDAP VACCINE 7 YRS/> IM: CPT

## 2025-07-07 PROCEDURE — 90471 IMMUNIZATION ADMIN: CPT

## 2025-07-07 RX ORDER — DULOXETINE 40 MG/1
CAPSULE, DELAYED RELEASE ORAL
COMMUNITY
Start: 2025-06-17

## 2025-07-07 RX ORDER — GABAPENTIN 250 MG/5ML
SOLUTION ORAL
COMMUNITY
Start: 2024-12-22

## 2025-07-07 NOTE — PROGRESS NOTES
Adult Annual Physical  Name: Vj Dumont      : 1960      MRN: [id number]  Encounter Provider: Roger Velasco PA-C  Encounter Date: 2025   Encounter department: [facility] PR[facility]    :  Assessment & Plan  Annual physical exam  Healthy appearing 65-year-old gentleman, vaccines reviewed.  He is due for tetanus vaccine and does have abrasion on his finger.  Tetanus given tonight.  Orders:  •  CBC and differential  •  Comprehensive metabolic panel  •  Lipid Panel with Direct LDL reflex  •  TSH, 3rd generation with Free T4 reflex    Screening for prostate cancer  Recommend reassessing PSA level.  Orders:  •  PSA, Total Screen    Mixed hyperlipidemia  Will reassess cholesterol levels.  Encourage healthy diet and exercise.  Orders:  •  CBC and differential  •  Comprehensive metabolic panel  •  Lipid Panel with Direct LDL reflex  •  TSH, 3rd generation with Free T4 reflex    Encounter for immunization    Orders:  •  TDAP VACCINE GREATER THAN OR EQUAL TO 6YO IM    Skin abrasion    Orders:  •  TDAP VACCINE GREATER THAN OR EQUAL TO 6YO IM    Eustachian tube dysfunction, left  Left tympanic membrane has some clear fluid behind it.  There is a small area of erythema on the tympanic membrane likely from trauma in the past week.  Recommend using Flonase nasal spray as necessary to help alleviate fluid and open eustachian tube.           Preventive Screenings:  - Diabetes Screening: risks/benefits discussed and orders placed  - Cholesterol Screening: screening not indicated and has hyperlipidemia   - Hepatitis C screening: patient declines   - HIV screening: patient declines   - Colon cancer screening: screening up-to-date   - Lung cancer screening: screening not indicated   - Prostate cancer screening: risks/benefits discussed and orders placed   - AAA screening: risks/benefits discussed     Immunizations:  - Immunizations due: Prevnar 20 and Zoster (Shingrix)      Depression Screening and Follow-up  Plan: Patient was screened for depression during today's encounter. They screened negative with a PHQ-9 score of 0.      Tobacco Cessation Counseling: The patient is sincerely urged to quit consumption of tobacco. He is not ready to quit tobacco.         History of Present Illness     Adult Annual Physical:  Patient presents for annual physical. HPI: This is a 65-year-old gentleman that presents to the office for annual physical exam.  He has been feeling well but he has had a little bit of ear discomfort on the left side recently.  He states that about a week ago he did poke it in the eardrum.  His blood pressure is also slightly elevated today.  He does not have a cuff for monitor at home.  His father passed away at the age of 70 from a stroke.  There is no other cardiac disease in the family.  His mother passed away from breast cancer.  He continues to stay physically active at work but does not necessarily get a lot of cardiovascular exercise on a regular basis.  He does also have a small skin abrasion on his finger and is wondering if he should get a tetanus booster up-to-date.  He has not had any purulence or redness around it..     Diet and Physical Activity:  - Diet/Nutrition: no special diet, frequent junk food and consuming 3-5 servings of fruits/vegetables daily.  - Exercise: no formal exercise.    Depression Screening:    - PHQ-9 Score: 0    General Health:  - Sleep: 7-8 hours of sleep on average and sleeps poorly.  - Hearing: normal hearing bilateral ears. left ear clogged  - Vision: wears contacts and most recent eye exam < 1 year ago.  - Dental: regular dental visits, brushes teeth once daily and does not floss.     Health:  - History of STDs: no.   - Urinary symptoms: none.     Review of Systems   Constitutional:  Negative for chills, fatigue and fever.   HENT:  Negative for congestion, ear pain and sinus pressure.    Eyes:  Negative for visual disturbance.   Respiratory:  Negative for cough, chest  "tightness and shortness of breath.    Cardiovascular:  Negative for chest pain and palpitations.   Gastrointestinal:  Negative for diarrhea, nausea and vomiting.   Endocrine: Negative for polyuria.   Genitourinary:  Negative for dysuria and frequency.   Musculoskeletal:  Negative for arthralgias and myalgias.   Skin:  Negative for pallor and rash.   Neurological:  Negative for dizziness, weakness, light-headedness, numbness and headaches.   Psychiatric/Behavioral:  Negative for agitation, behavioral problems and sleep disturbance.    All other systems reviewed and are negative.        Objective   /88 (BP Location: Left arm, Patient Position: Sitting, Cuff Size: Standard)   Pulse 76   Ht 5' 7.75\" (1.721 m)   Wt 87.5 kg (193 lb)   SpO2 98%   BMI 29.56 kg/m²     Physical Exam  Constitutional:       General: He is not in acute distress.     Appearance: He is well-developed.   HENT:      Head: Normocephalic and atraumatic.      Right Ear: Tympanic membrane normal.      Left Ear: Tympanic membrane normal.     Eyes:      Conjunctiva/sclera: Conjunctivae normal.       Cardiovascular:      Rate and Rhythm: Normal rate and regular rhythm.   Pulmonary:      Effort: Pulmonary effort is normal.   Abdominal:      General: Abdomen is flat. Bowel sounds are normal. There is no distension.      Palpations: Abdomen is soft. There is no mass.     Musculoskeletal:         General: Normal range of motion.      Cervical back: Normal range of motion.     Skin:     General: Skin is warm.      Findings: No rash.     Neurological:      Mental Status: He is alert and oriented to person, place, and time.     Psychiatric:         Mood and Affect: Mood normal.         "

## 2025-07-07 NOTE — PATIENT INSTRUCTIONS
"Patient Education     Routine physical for adults   The Basics   Written by the doctors and editors at [facility]   What is a physical? -- A physical is a routine visit, or \"check-up,\" with your doctor. You might also hear it called a \"wellness visit\" or \"preventive visit.\"  During each visit, the doctor will:   Ask about your physical and mental health   Ask about your habits, behaviors, and lifestyle   Do an exam   Give you vaccines if needed   Talk to you about any medicines you take   Give advice about your health   Answer your questions  Getting regular check-ups is an important part of taking care of your health. It can help your doctor find and treat any problems you have. But it's also important for preventing health problems.  A routine physical is different from a \"sick visit.\" A sick visit is when you see a doctor because of a health concern or problem. Since physicals are scheduled ahead of time, you can think about what you want to ask the doctor.  How often should I get a physical? -- It depends on your age and health. In general, for people age 21 years and older:   If you are younger than 50 years, you might be able to get a physical every 3 years.   If you are 50 years or older, your doctor might recommend a physical every year.  If you have an ongoing health condition, like diabetes or high blood pressure, your doctor will probably want to see you more often.  What happens during a physical? -- In general, each visit will include:   Physical exam - The doctor or nurse will check your height, weight, heart rate, and blood pressure. They will also look at your eyes and ears. They will ask about how you are feeling and whether you have any symptoms that bother you.   Medicines - It's a good idea to bring a list of all the medicines you take to each doctor visit. Your doctor will talk to you about your medicines and answer any questions. Tell them if you are having any side effects that bother you. You " "should also tell them if you are having trouble paying for any of your medicines.   Habits and behaviors - This includes:   Your diet   Your exercise habits   Whether you smoke, drink alcohol, or use drugs   Whether you are sexually active   Whether you feel safe at home  Your doctor will talk to you about things you can do to improve your health and lower your risk of health problems. They will also offer help and support. For example, if you want to quit smoking, they can give you advice and might prescribe medicines. If you want to improve your diet or get more physical activity, they can help you with this, too.   Lab tests, if needed - The tests you get will depend on your age and situation. For example, your doctor might want to check your:   Cholesterol   Blood sugar   Iron level   Vaccines - The recommended vaccines will depend on your age, health, and what vaccines you already had. Vaccines are very important because they can prevent certain serious or deadly infections.   Discussion of screening - \"Screening\" means checking for diseases or other health problems before they cause symptoms. Your doctor can recommend screening based on your age, risk, and preferences. This might include tests to check for:   Cancer, such as breast, prostate, cervical, ovarian, colorectal, prostate, lung, or skin cancer   Sexually transmitted infections, such as chlamydia and gonorrhea   Mental health conditions like depression and anxiety  Your doctor will talk to you about the different types of screening tests. They can help you decide which screenings to have. They can also explain what the results might mean.   Answering questions - The physical is a good time to ask the doctor or nurse questions about your health. If needed, they can refer you to other doctors or specialists, too.  Adults older than 65 years often need other care, too. As you get older, your doctor will talk to you about:   How to prevent falling at " home   Hearing or vision tests   Memory testing   How to take your medicines safely   Making sure that you have the help and support you need at home  All topics are updated as new evidence becomes available and our peer review process is complete.  This topic retrieved from [employer] on: May 02, 2024.  Topic 021220 Version 1.0  Release: 32.4.3 - C32.122  © 2024 UpToDate, Inc. and/or its affiliates. All rights reserved.  Consumer Information Use and Disclaimer   Disclaimer: This generalized information is a limited summary of diagnosis, treatment, and/or medication information. It is not meant to be comprehensive and should be used as a tool to help the user understand and/or assess potential diagnostic and treatment options. It does NOT include all information about conditions, treatments, medications, side effects, or risks that may apply to a specific patient. It is not intended to be medical advice or a substitute for the medical advice, diagnosis, or treatment of a health care provider based on the health care provider's examination and assessment of a patient's specific and unique circumstances. Patients must speak with a health care provider for complete information about their health, medical questions, and treatment options, including any risks or benefits regarding use of medications. This information does not endorse any treatments or medications as safe, effective, or approved for treating a specific patient. Up[employer]Date, Inc. and its affiliates disclaim any warranty or liability relating to this information or the use thereof.The use of this information is governed by the Terms of Use, available at https://www.woltersStudent Designeduwer.com/en/know/clinical-effectiveness-terms. 2024© UpToDate, Inc. and its affiliates and/or licensors. All rights reserved.  Copyright   © 2024 Up[employer]Date, Inc. and/or its affiliates. All rights reserved.

## 2025-07-07 NOTE — ASSESSMENT & PLAN NOTE
Will reassess cholesterol levels.  Encourage healthy diet and exercise.  Orders:  •  CBC and differential  •  Comprehensive metabolic panel  •  Lipid Panel with Direct LDL reflex  •  TSH, 3rd generation with Free T4 reflex

## 2025-07-26 ENCOUNTER — APPOINTMENT (OUTPATIENT)
Dept: LAB | Facility: CLINIC | Age: 65
End: 2025-07-26
Payer: COMMERCIAL

## 2025-07-26 LAB
ALBUMIN SERPL BCG-MCNC: 4 G/DL (ref 3.5–5)
ALP SERPL-CCNC: 67 U/L (ref 34–104)
ALT SERPL W P-5'-P-CCNC: 16 U/L (ref 7–52)
ANION GAP SERPL CALCULATED.3IONS-SCNC: 10 MMOL/L (ref 4–13)
AST SERPL W P-5'-P-CCNC: 16 U/L (ref 13–39)
BASOPHILS # BLD AUTO: 0.06 THOUSANDS/ÂΜL (ref 0–0.1)
BASOPHILS NFR BLD AUTO: 1 % (ref 0–1)
BILIRUB SERPL-MCNC: 0.61 MG/DL (ref 0.2–1)
BUN SERPL-MCNC: 12 MG/DL (ref 5–25)
CALCIUM SERPL-MCNC: 8.8 MG/DL (ref 8.4–10.2)
CHLORIDE SERPL-SCNC: 105 MMOL/L (ref 96–108)
CHOLEST SERPL-MCNC: 187 MG/DL (ref ?–200)
CO2 SERPL-SCNC: 26 MMOL/L (ref 21–32)
CREAT SERPL-MCNC: 0.86 MG/DL (ref 0.6–1.3)
EOSINOPHIL # BLD AUTO: 0.33 THOUSAND/ÂΜL (ref 0–0.61)
EOSINOPHIL NFR BLD AUTO: 7 % (ref 0–6)
ERYTHROCYTE [DISTWIDTH] IN BLOOD BY AUTOMATED COUNT: 12.7 % (ref 11.6–15.1)
GFR SERPL CREATININE-BSD FRML MDRD: 90 ML/MIN/1.73SQ M
GLUCOSE P FAST SERPL-MCNC: 79 MG/DL (ref 65–99)
HCT VFR BLD AUTO: 46.2 % (ref 36.5–49.3)
HDLC SERPL-MCNC: 49 MG/DL
HGB BLD-MCNC: 15.2 G/DL (ref 12–17)
IMM GRANULOCYTES # BLD AUTO: 0.01 THOUSAND/UL (ref 0–0.2)
IMM GRANULOCYTES NFR BLD AUTO: 0 % (ref 0–2)
LDLC SERPL CALC-MCNC: 114 MG/DL (ref 0–100)
LYMPHOCYTES # BLD AUTO: 1.4 THOUSANDS/ÂΜL (ref 0.6–4.47)
LYMPHOCYTES NFR BLD AUTO: 29 % (ref 14–44)
MCH RBC QN AUTO: 29.3 PG (ref 26.8–34.3)
MCHC RBC AUTO-ENTMCNC: 32.9 G/DL (ref 31.4–37.4)
MCV RBC AUTO: 89 FL (ref 82–98)
MONOCYTES # BLD AUTO: 0.56 THOUSAND/ÂΜL (ref 0.17–1.22)
MONOCYTES NFR BLD AUTO: 12 % (ref 4–12)
NEUTROPHILS # BLD AUTO: 2.48 THOUSANDS/ÂΜL (ref 1.85–7.62)
NEUTS SEG NFR BLD AUTO: 51 % (ref 43–75)
NRBC BLD AUTO-RTO: 0 /100 WBCS
PLATELET # BLD AUTO: 260 THOUSANDS/UL (ref 149–390)
PMV BLD AUTO: 9.4 FL (ref 8.9–12.7)
POTASSIUM SERPL-SCNC: 4.4 MMOL/L (ref 3.5–5.3)
PROT SERPL-MCNC: 6.6 G/DL (ref 6.4–8.4)
PSA SERPL-MCNC: 2.64 NG/ML (ref 0–4)
RBC # BLD AUTO: 5.19 MILLION/UL (ref 3.88–5.62)
SODIUM SERPL-SCNC: 141 MMOL/L (ref 135–147)
TRIGL SERPL-MCNC: 120 MG/DL (ref ?–150)
TSH SERPL DL<=0.05 MIU/L-ACNC: 1.67 UIU/ML (ref 0.45–4.5)
WBC # BLD AUTO: 4.84 THOUSAND/UL (ref 4.31–10.16)

## 2025-08-12 ENCOUNTER — OFFICE VISIT (OUTPATIENT)
Dept: FAMILY MEDICINE CLINIC | Facility: CLINIC | Age: 65
End: 2025-08-12
Payer: COMMERCIAL